# Patient Record
Sex: FEMALE | Race: WHITE | NOT HISPANIC OR LATINO | URBAN - METROPOLITAN AREA
[De-identification: names, ages, dates, MRNs, and addresses within clinical notes are randomized per-mention and may not be internally consistent; named-entity substitution may affect disease eponyms.]

---

## 2018-10-30 ENCOUNTER — IMPORTED ENCOUNTER (OUTPATIENT)
Dept: URBAN - METROPOLITAN AREA CLINIC 31 | Facility: CLINIC | Age: 83
End: 2018-10-30

## 2018-10-30 PROBLEM — Z96.1: Noted: 2018-10-30

## 2018-10-30 PROBLEM — H04.123: Noted: 2018-10-30

## 2018-10-30 PROCEDURE — 92014 COMPRE OPH EXAM EST PT 1/>: CPT

## 2018-10-30 PROCEDURE — 92015 DETERMINE REFRACTIVE STATE: CPT

## 2018-10-30 NOTE — PATIENT DISCUSSION
1.  Dry Eye OU:   SPK on OD present. Start using Artificial Tears. 2.  Pseudophakia OU - IOLs stable. Monitor. clear capsules ou3. NO rx change. Using OTC. 4.  Early Dementia5.   RTn 1 yr CE  [employer] of TV and [employer] movies

## 2019-11-01 ENCOUNTER — IMPORTED ENCOUNTER (OUTPATIENT)
Dept: URBAN - METROPOLITAN AREA CLINIC 31 | Facility: CLINIC | Age: 84
End: 2019-11-01

## 2019-11-01 PROBLEM — Z96.1: Noted: 2019-11-01

## 2019-11-01 PROBLEM — H04.123: Noted: 2019-11-01

## 2019-11-01 PROCEDURE — 92015 DETERMINE REFRACTIVE STATE: CPT

## 2019-11-01 PROCEDURE — 92014 COMPRE OPH EXAM EST PT 1/>: CPT

## 2019-11-01 NOTE — PATIENT DISCUSSION
1.  Dry Eye OU:   SPK better OD present. Start using Artificial Tears. 2.  Pseudophakia OU - IOLs stable. Monitor. clear capsules ou3. NO rx change. Using OTC. 4.  Early Dementia5.   RTn 1 yr CE  [employer] of TV and [employer] movies

## 2020-11-02 ENCOUNTER — IMPORTED ENCOUNTER (OUTPATIENT)
Dept: URBAN - METROPOLITAN AREA CLINIC 31 | Facility: CLINIC | Age: 85
End: 2020-11-02

## 2020-11-02 PROBLEM — Z96.1: Noted: 2020-11-02

## 2020-11-02 PROBLEM — H04.123: Noted: 2020-11-02

## 2020-11-02 PROCEDURE — 92015 DETERMINE REFRACTIVE STATE: CPT

## 2020-11-02 PROCEDURE — 92014 COMPRE OPH EXAM EST PT 1/>: CPT

## 2020-11-02 NOTE — PATIENT DISCUSSION
1.  Dry Eye OU:   SPK better OD present. Start using Artificial Tears. 2.  Pseudophakia OU - IOLs stable. Monitor. clear capsules ou3. NO rx change. Using OTC. 4.  Early Dementia5.   RTn 1 yr CE  [employer] of TV and [employer] movies

## 2021-11-01 ENCOUNTER — IMPORTED ENCOUNTER (OUTPATIENT)
Dept: URBAN - METROPOLITAN AREA CLINIC 31 | Facility: CLINIC | Age: 86
End: 2021-11-01

## 2021-11-01 PROBLEM — Z96.1: Noted: 2021-11-01

## 2021-11-01 PROBLEM — H04.123: Noted: 2021-11-01

## 2021-11-01 PROCEDURE — 92015 DETERMINE REFRACTIVE STATE: CPT

## 2021-11-01 PROCEDURE — 92014 COMPRE OPH EXAM EST PT 1/>: CPT

## 2021-11-01 NOTE — PATIENT DISCUSSION
1.  Dry Eye OU:   SPK better OD present. Start using Artificial Tears. 2.  Pseudophakia OU - IOLs stable. Monitor. clear capsules ou3. NO rx change. Using OTC. 4.  Early Dementia5.   RTn 1 yr CE  --NO MRX NEEDEDWatches alot of TV and cow[employer] movies

## 2022-04-01 ASSESSMENT — TONOMETRY
OS_IOP_MMHG: 13
OS_IOP_MMHG: 14
OD_IOP_MMHG: 13
OS_IOP_MMHG: 13
OD_IOP_MMHG: 13
OD_IOP_MMHG: 12
OS_IOP_MMHG: 13
OD_IOP_MMHG: 13

## 2022-04-01 ASSESSMENT — VISUAL ACUITY
OS_SC: 20/50
OD_CC: 20/40
OS_CC: 20/20
OS_CC: 20/30
OD_SC: J2-2
OD_CC: 20/30
OS_CC: 20/30
OD_SC: 20/50
OD_CC: 20/30+1
OD_CC: 20/20-1
OD_SC: 20/50
OS_SC: J3-2
OS_SC: 20/50
OS_CC: 20/25+2

## 2022-10-31 ENCOUNTER — ESTABLISHED PATIENT (OUTPATIENT)
Dept: URBAN - METROPOLITAN AREA CLINIC 34 | Facility: CLINIC | Age: 87
End: 2022-10-31

## 2022-10-31 DIAGNOSIS — H04.123: ICD-10-CM

## 2022-10-31 PROCEDURE — 92014 COMPRE OPH EXAM EST PT 1/>: CPT

## 2022-10-31 ASSESSMENT — VISUAL ACUITY
OS_SC: 20/25-3
OD_SC: 20/60
OD_SC: 20/30+1
OS_SC: 20/40

## 2022-10-31 ASSESSMENT — TONOMETRY
OS_IOP_MMHG: 13
OD_IOP_MMHG: 14